# Patient Record
Sex: MALE
[De-identification: names, ages, dates, MRNs, and addresses within clinical notes are randomized per-mention and may not be internally consistent; named-entity substitution may affect disease eponyms.]

---

## 2019-04-14 ENCOUNTER — HOSPITAL ENCOUNTER (OUTPATIENT)
Dept: HOSPITAL 88 - ER | Age: 44
Setting detail: OBSERVATION
LOS: 1 days | Discharge: HOME | End: 2019-04-15
Attending: INTERNAL MEDICINE | Admitting: INTERNAL MEDICINE
Payer: COMMERCIAL

## 2019-04-14 VITALS — DIASTOLIC BLOOD PRESSURE: 77 MMHG | SYSTOLIC BLOOD PRESSURE: 126 MMHG

## 2019-04-14 VITALS — HEIGHT: 62 IN | BODY MASS INDEX: 27.98 KG/M2 | WEIGHT: 152.06 LBS

## 2019-04-14 VITALS — SYSTOLIC BLOOD PRESSURE: 131 MMHG | DIASTOLIC BLOOD PRESSURE: 78 MMHG

## 2019-04-14 DIAGNOSIS — E78.5: ICD-10-CM

## 2019-04-14 DIAGNOSIS — Z82.49: ICD-10-CM

## 2019-04-14 DIAGNOSIS — R91.8: ICD-10-CM

## 2019-04-14 DIAGNOSIS — R07.9: Primary | ICD-10-CM

## 2019-04-14 LAB
ALBUMIN SERPL-MCNC: 3.9 G/DL (ref 3.5–5)
ALBUMIN/GLOB SERPL: 1.2 {RATIO} (ref 0.8–2)
ALP SERPL-CCNC: 59 IU/L (ref 40–150)
ALT SERPL-CCNC: 42 IU/L (ref 0–55)
ANION GAP SERPL CALC-SCNC: 12 MMOL/L (ref 8–16)
BASOPHILS # BLD AUTO: 0 10*3/UL (ref 0–0.1)
BASOPHILS NFR BLD AUTO: 0.4 % (ref 0–1)
BUN SERPL-MCNC: 20 MG/DL (ref 7–26)
BUN/CREAT SERPL: 19 (ref 6–25)
CALCIUM SERPL-MCNC: 9.4 MG/DL (ref 8.4–10.2)
CHLORIDE SERPL-SCNC: 106 MMOL/L (ref 98–107)
CK MB SERPL-MCNC: 0.5 NG/ML (ref 0–5)
CK MB SERPL-MCNC: 0.7 NG/ML (ref 0–5)
CK SERPL-CCNC: 107 IU/L (ref 30–200)
CK SERPL-CCNC: 141 IU/L (ref 30–200)
CO2 SERPL-SCNC: 25 MMOL/L (ref 22–29)
DEPRECATED APTT PLAS QN: 26.9 SECONDS (ref 23.8–35.5)
DEPRECATED INR PLAS: 0.91
DEPRECATED NEUTROPHILS # BLD AUTO: 5.2 10*3/UL (ref 2.1–6.9)
EGFRCR SERPLBLD CKD-EPI 2021: > 60 ML/MIN (ref 60–?)
EOSINOPHIL # BLD AUTO: 0.1 10*3/UL (ref 0–0.4)
EOSINOPHIL NFR BLD AUTO: 1.1 % (ref 0–6)
ERYTHROCYTE [DISTWIDTH] IN CORD BLOOD: 11.8 % (ref 11.7–14.4)
GLOBULIN PLAS-MCNC: 3.2 G/DL (ref 2.3–3.5)
GLUCOSE SERPLBLD-MCNC: 98 MG/DL (ref 74–118)
HCT VFR BLD AUTO: 42.8 % (ref 38.2–49.6)
HGB BLD-MCNC: 14.6 G/DL (ref 14–18)
LYMPHOCYTES # BLD: 1.5 10*3/UL (ref 1–3.2)
LYMPHOCYTES NFR BLD AUTO: 19.7 % (ref 18–39.1)
MCH RBC QN AUTO: 30.9 PG (ref 28–32)
MCHC RBC AUTO-ENTMCNC: 34.1 G/DL (ref 31–35)
MCV RBC AUTO: 90.5 FL (ref 81–99)
MONOCYTES # BLD AUTO: 0.6 10*3/UL (ref 0.2–0.8)
MONOCYTES NFR BLD AUTO: 8 % (ref 4.4–11.3)
NEUTS SEG NFR BLD AUTO: 70.3 % (ref 38.7–80)
PLATELET # BLD AUTO: 221 X10E3/UL (ref 140–360)
POTASSIUM SERPL-SCNC: 4 MMOL/L (ref 3.5–5.1)
PROTHROMBIN TIME: 12.7 SECONDS (ref 11.9–14.5)
RBC # BLD AUTO: 4.73 X10E6/UL (ref 4.3–5.7)
SODIUM SERPL-SCNC: 139 MMOL/L (ref 136–145)

## 2019-04-14 PROCEDURE — 85730 THROMBOPLASTIN TIME PARTIAL: CPT

## 2019-04-14 PROCEDURE — 82553 CREATINE MB FRACTION: CPT

## 2019-04-14 PROCEDURE — 84484 ASSAY OF TROPONIN QUANT: CPT

## 2019-04-14 PROCEDURE — 83880 ASSAY OF NATRIURETIC PEPTIDE: CPT

## 2019-04-14 PROCEDURE — 80048 BASIC METABOLIC PNL TOTAL CA: CPT

## 2019-04-14 PROCEDURE — 93005 ELECTROCARDIOGRAM TRACING: CPT

## 2019-04-14 PROCEDURE — 85379 FIBRIN DEGRADATION QUANT: CPT

## 2019-04-14 PROCEDURE — 36415 COLL VENOUS BLD VENIPUNCTURE: CPT

## 2019-04-14 PROCEDURE — 82550 ASSAY OF CK (CPK): CPT

## 2019-04-14 PROCEDURE — 80053 COMPREHEN METABOLIC PANEL: CPT

## 2019-04-14 PROCEDURE — 85025 COMPLETE CBC W/AUTO DIFF WBC: CPT

## 2019-04-14 PROCEDURE — 78452 HT MUSCLE IMAGE SPECT MULT: CPT

## 2019-04-14 PROCEDURE — 80061 LIPID PANEL: CPT

## 2019-04-14 PROCEDURE — 71045 X-RAY EXAM CHEST 1 VIEW: CPT

## 2019-04-14 PROCEDURE — 71260 CT THORAX DX C+: CPT

## 2019-04-14 PROCEDURE — 85610 PROTHROMBIN TIME: CPT

## 2019-04-14 PROCEDURE — 99284 EMERGENCY DEPT VISIT MOD MDM: CPT

## 2019-04-14 PROCEDURE — 93306 TTE W/DOPPLER COMPLETE: CPT

## 2019-04-14 PROCEDURE — 93017 CV STRESS TEST TRACING ONLY: CPT

## 2019-04-14 RX ADMIN — SODIUM CHLORIDE SCH MLS/HR: 9 INJECTION, SOLUTION INTRAVENOUS at 17:42

## 2019-04-14 RX ADMIN — FAMOTIDINE SCH MG: 20 TABLET, FILM COATED ORAL at 17:45

## 2019-04-14 NOTE — NUR
Got report from previous nurse. Call light within reach. patients family at bedside. no pain 
or distress noted.

## 2019-04-14 NOTE — XMS REPORT
Patient Summary Document

                             Created on: 2019



XIANG HAMM

External Reference #: [id number]

: 1975

Sex: Male



Demographics







                          Address                   [id number] [place] [address]  [id number]

 

                          Home Phone                [phone]

 

                          Preferred Language        Unknown

 

                          Marital Status            Unknown

 

                          Evangelical Affiliation     Unknown

 

                          Race                      Unknown

 

                                        Additional Race(s)  

 

                          Ethnic Group              Unknown





Author







                          Author                    [facility]

 

                          [facility]

 

                          Address                   Unknown

 

                          Phone                     Unavailable







Care Team Providers







                    Care Team Member Name    Role                Phone

 

                    FARIDEH GAMING    Unavailable         Unavailable







Problems

This patient has no known problems.



Allergies, Adverse Reactions, Alerts

This patient has no known allergies or adverse reactions.



Medications

This patient has no known medications.



Results







           Test Description    Test Time    Test Comments    Text Results    Atomic Results    Result

 Comments

 

                CHEST SINGLE (PORTABLE)    2019 15:30:00                                                   

                                                       [place] [address]      Patient Name: XIANG HAMM                        
          MR #: [id number]                     : 1975                  
                Age/Sex: 44/M  Acct #: [id number]                             
Req #: [id number]  Adm Physician:                                               
      Ordered by: MARY GAMING MD                            Report #: [id number]
[id number]        Location: ER                                      Room/Bed:         
           
___________________________________________________________________________________________________
   Procedure: [id number] DX/CHEST SINGLE (PORTABLE)  Exam Date: 19         
                  Exam Time: 1525                                              
REPORT STATUS: Signed    Examination: Single AP view of the chest.      COM
PARISON: None.      INDICATION: Chest pain           DISCUSSION:      
Lines/tubes:  None.      Lungs:  The lungs are well inflated and clear. No 
pneumonia or pulmonary edema.      Pleura:  No pleural effusion or pneumothorax.
     Heart and mediastinum:  The heart and the mediastinum are unremarkable.    
 Bones and soft tissues:  No acute bony abnormalities.        IMPRESSION:       
1.   No acute cardiopulmonary abnormalities.      Signed by: Dr. Andrew Palisch,
M.D. on 2019 3:30 PM        Dictated By: ANDREW R PALISCH MD  
Electronically Signed By: ANDREW R PALISCH MD on 19 1530  Transcribed By: 
OCTAVIO on 19 1530       COPY TO:   MARY GAMING MD

## 2019-04-15 VITALS — SYSTOLIC BLOOD PRESSURE: 103 MMHG | DIASTOLIC BLOOD PRESSURE: 57 MMHG

## 2019-04-15 VITALS — DIASTOLIC BLOOD PRESSURE: 67 MMHG | SYSTOLIC BLOOD PRESSURE: 118 MMHG

## 2019-04-15 VITALS — SYSTOLIC BLOOD PRESSURE: 123 MMHG | DIASTOLIC BLOOD PRESSURE: 70 MMHG

## 2019-04-15 VITALS — DIASTOLIC BLOOD PRESSURE: 69 MMHG | SYSTOLIC BLOOD PRESSURE: 118 MMHG

## 2019-04-15 LAB
ANION GAP SERPL CALC-SCNC: 10.1 MMOL/L (ref 8–16)
BASOPHILS # BLD AUTO: 0 10*3/UL (ref 0–0.1)
BASOPHILS NFR BLD AUTO: 0.4 % (ref 0–1)
BUN SERPL-MCNC: 17 MG/DL (ref 7–26)
BUN/CREAT SERPL: 19 (ref 6–25)
CALCIUM SERPL-MCNC: 8.6 MG/DL (ref 8.4–10.2)
CHLORIDE SERPL-SCNC: 108 MMOL/L (ref 98–107)
CHOLEST SERPL-MCNC: 156 MD/DL (ref 0–199)
CHOLEST/HDLC SERPL: 3.5 {RATIO} (ref 3.9–4.7)
CK MB SERPL-MCNC: 0.5 NG/ML (ref 0–5)
CK MB SERPL-MCNC: 0.5 NG/ML (ref 0–5)
CK SERPL-CCNC: 102 IU/L (ref 30–200)
CK SERPL-CCNC: 96 IU/L (ref 30–200)
CO2 SERPL-SCNC: 24 MMOL/L (ref 22–29)
DEPRECATED NEUTROPHILS # BLD AUTO: 4.3 10*3/UL (ref 2.1–6.9)
EGFRCR SERPLBLD CKD-EPI 2021: > 60 ML/MIN (ref 60–?)
EOSINOPHIL # BLD AUTO: 0.1 10*3/UL (ref 0–0.4)
EOSINOPHIL NFR BLD AUTO: 1.5 % (ref 0–6)
ERYTHROCYTE [DISTWIDTH] IN CORD BLOOD: 11.9 % (ref 11.7–14.4)
GLUCOSE SERPLBLD-MCNC: 100 MG/DL (ref 74–118)
HCT VFR BLD AUTO: 39.6 % (ref 38.2–49.6)
HDLC SERPL-MSCNC: 44 MG/DL (ref 40–60)
HGB BLD-MCNC: 13.6 G/DL (ref 14–18)
LDLC SERPL CALC-MCNC: 89 MG/DL (ref 60–130)
LYMPHOCYTES # BLD: 1.9 10*3/UL (ref 1–3.2)
LYMPHOCYTES NFR BLD AUTO: 27 % (ref 18–39.1)
MCH RBC QN AUTO: 31.1 PG (ref 28–32)
MCHC RBC AUTO-ENTMCNC: 34.3 G/DL (ref 31–35)
MCV RBC AUTO: 90.4 FL (ref 81–99)
MONOCYTES # BLD AUTO: 0.6 10*3/UL (ref 0.2–0.8)
MONOCYTES NFR BLD AUTO: 8.6 % (ref 4.4–11.3)
NEUTS SEG NFR BLD AUTO: 62.2 % (ref 38.7–80)
PLATELET # BLD AUTO: 207 X10E3/UL (ref 140–360)
POTASSIUM SERPL-SCNC: 4.1 MMOL/L (ref 3.5–5.1)
RBC # BLD AUTO: 4.38 X10E6/UL (ref 4.3–5.7)
SODIUM SERPL-SCNC: 138 MMOL/L (ref 136–145)
TRIGL SERPL-MCNC: 114 MG/DL (ref 0–149)

## 2019-04-15 RX ADMIN — SODIUM CHLORIDE SCH MLS/HR: 9 INJECTION, SOLUTION INTRAVENOUS at 12:30

## 2019-04-15 RX ADMIN — FAMOTIDINE SCH MG: 20 TABLET, FILM COATED ORAL at 04:43

## 2019-04-15 RX ADMIN — SODIUM CHLORIDE SCH MLS/HR: 9 INJECTION, SOLUTION INTRAVENOUS at 02:30

## 2019-04-15 RX ADMIN — FAMOTIDINE SCH MG: 20 TABLET, FILM COATED ORAL at 17:04

## 2019-04-16 NOTE — DISCHARGE SUMMARY
DISCHARGE DIAGNOSIS:  Chest pain, rule out myocardial infarction.

 

HISTORY OF PRESENT ILLNESS AND HOSPITAL COURSE:  Please see hospital chart for full

details.  The patient is a gentleman, who presented with some chest pain after working

in his garage and that resolved by the time he was admitted.  He was ruled out for MI by

EKGs and enzymes.  He had a CT scan that was unremarkable.  Pulmonary embolus, he was

seen by Cardiology.  He did have a nuclear stress test done that was negative.  CT scan

did show some evidence of pulmonary nodules that appear to be from prior granulomatous

disease, but they are very small to characterize.  The patient is not high risk for

cancer, but he was told that he needs to follow up in about 12 months with a repeat CT

scan and that he needs to be sure that he reminds me in one year to order those, which

he understood.  He will call me in 1 to 2 weeks.  Please see hospital chart for full

details. 

 

 

 

 

______________________________

MD WAYNE Mora/SUAD

D:  04/16/2019 05:20:17

T:  04/16/2019 06:27:43

Job #:  [id number]

## 2019-04-18 NOTE — MYOVIEW STRESS TEST
DATE OF STUDY:  04/15/2019 09:37:00

 

Stress Test - Treadmill ONLY

 

Accession #[id number]

 

PROCEDURE TITLE:  Rest/stress single isotope SPECT imaging with pharmacologic stress and

gated SPECT imaging. 

 

DESCRIPTION OF PROCEDURE:  Pharmacologic stress testing was performed with regadenoson

per protocol.  The heart rate was 75 beats per minute at rest and increased to 121 beats

per minute during the regadenoson infusion.  The rest blood pressure was 110/82 mmHg and

decreased to 108/70 mmHg, which is a normal response.  The resting electrocardiogram

demonstrated normal sinus rhythm.  There were no ST-segment changes suggestive of

myocardial ischemia. 

 

Myocardial perfusion imaging was performed at rest following the injection of 11 mCi of

tetrofosmin.  At peak pharmacologic effect, the patient was injected with 31.9 mCi of

tetrofosmin.  Gated post-stress tomographic imaging was performed. 

 

FINDINGS:  The overall quality of study is fair.  Left ventricular cavity is noted to be

normal size on the rest and stress studies.  SPECT images demonstrated homogeneous

tracer distribution throughout the myocardium.  Gated SPECT imaging reveals normal

myocardial thickening and wall motion.  The left ventricular ejection fraction was

calculated to be 55%. 

 

IMPRESSION:  Myocardial perfusion imaging is normal.  Overall left ventricular systolic

function was normal without regional wall motion abnormalities. 

 

 

 

 

______________________________

Izzy Capps MD

 

ABS/MODL

D:  04/15/2019 18:26:40

T:  04/15/2019 21:06:54

Job #:  [id number]